# Patient Record
Sex: MALE | Race: WHITE | NOT HISPANIC OR LATINO | Employment: FULL TIME | URBAN - METROPOLITAN AREA
[De-identification: names, ages, dates, MRNs, and addresses within clinical notes are randomized per-mention and may not be internally consistent; named-entity substitution may affect disease eponyms.]

---

## 2018-08-08 ENCOUNTER — OFFICE VISIT (OUTPATIENT)
Dept: URGENT CARE | Facility: CLINIC | Age: 28
End: 2018-08-08

## 2018-08-08 ENCOUNTER — NON-PROVIDER VISIT (OUTPATIENT)
Dept: URGENT CARE | Facility: CLINIC | Age: 28
End: 2018-08-08

## 2018-08-08 VITALS
SYSTOLIC BLOOD PRESSURE: 120 MMHG | DIASTOLIC BLOOD PRESSURE: 72 MMHG | HEIGHT: 74 IN | OXYGEN SATURATION: 100 % | BODY MASS INDEX: 19.25 KG/M2 | TEMPERATURE: 98.2 F | WEIGHT: 150 LBS | HEART RATE: 78 BPM

## 2018-08-08 DIAGNOSIS — Z02.5 ROUTINE SPORTS PHYSICAL EXAM: ICD-10-CM

## 2018-08-08 DIAGNOSIS — Z02.1 PRE-EMPLOYMENT DRUG TESTING: ICD-10-CM

## 2018-08-08 PROCEDURE — 8907 PR URINE COLLECT ONLY: Performed by: FAMILY MEDICINE

## 2018-08-08 PROCEDURE — 7100 PR DOT PHYSICAL: Performed by: FAMILY MEDICINE

## 2019-10-19 ENCOUNTER — OCCUPATIONAL MEDICINE (OUTPATIENT)
Dept: URGENT CARE | Facility: PHYSICIAN GROUP | Age: 29
End: 2019-10-19
Payer: COMMERCIAL

## 2019-10-19 VITALS
HEART RATE: 96 BPM | OXYGEN SATURATION: 98 % | TEMPERATURE: 98.4 F | RESPIRATION RATE: 16 BRPM | HEIGHT: 74 IN | SYSTOLIC BLOOD PRESSURE: 128 MMHG | WEIGHT: 150 LBS | DIASTOLIC BLOOD PRESSURE: 80 MMHG | BODY MASS INDEX: 19.25 KG/M2

## 2019-10-19 DIAGNOSIS — Z23 NEED FOR TETANUS BOOSTER: ICD-10-CM

## 2019-10-19 DIAGNOSIS — S51.811A FOREARM LACERATION, RIGHT, INITIAL ENCOUNTER: Primary | ICD-10-CM

## 2019-10-19 PROCEDURE — 12002 RPR S/N/AX/GEN/TRNK2.6-7.5CM: CPT | Mod: 29 | Performed by: PHYSICIAN ASSISTANT

## 2019-10-19 PROCEDURE — 90715 TDAP VACCINE 7 YRS/> IM: CPT | Mod: 29 | Performed by: PHYSICIAN ASSISTANT

## 2019-10-19 PROCEDURE — 90471 IMMUNIZATION ADMIN: CPT | Mod: 29 | Performed by: PHYSICIAN ASSISTANT

## 2019-10-19 NOTE — LETTER
[facility]  [address] [place] [address] [place]  [place] [address]  Phone:  [phone] - Fax:  [phone]   [facility] Progress Report and Disability Certification  Date of Service: 10/19/2019   No Show:  No  Date / Time of Next Visit: 10/29/2019   Claim Information   Patient Name: Marko Lawton  Claim Number:     Employer: [employer]  Date of Injury: 10/19/2019     Insurer / TPA: [employer]  ID / SSN:     Occupation:   Diagnosis: The primary encounter diagnosis was Forearm laceration, right, initial encounter. A diagnosis of Need for tetanus booster was also pertinent to this visit.    Medical Information   Related to Industrial Injury? Yes    Subjective Complaints:  Right forearm laceration   Objective Findings: 6 cm linear laceration to the proximal aspect of the right forearm   Pre-Existing Condition(s):     Assessment:   Initial Visit    Status: Additional Care Required  Comments:Follow-up in 10 days  Permanent Disability:No    Plan:      Diagnostics:      Comments:       Disability Information   Status: Released to Restricted Duty    From:  10/19/2019  Through: 10/29/2019 Restrictions are: Temporary   Physical Restrictions   Sitting:    Standing:    Stooping:    Bending:      Squatting:    Walking:    Climbing:    Pushing:      Pulling:    Other:    Reaching Above Shoulder (L):   Reaching Above Shoulder (R):       Reaching Below Shoulder (L):    Reaching Below Shoulder (R):      Not to exceed Weight Limits   Carrying(hrs):   Weight Limit(lb): < or = to 10 pounds Lifting(hrs):   Weight  Limit(lb): < or = to 10 pounds   Comments: No lifting more than 10 pounds with right arm.  No other restrictions    Repetitive Actions   Hands: i.e. Fine Manipulations from Grasping:     Feet: i.e. Operating Foot Controls:     Driving / Operate Machinery:     Physician Name: Henry Leyva P.A.-C. Physician Signature: HENRY Maynard P.A.-C. e-Signature: Dr. Chacon  Carlota, Medical Director   Clinic Name / Location: [address] [place] [address] Clinic Phone Number: Dept: [phone]   Appointment Time: 11:00 Am Visit Start Time: 11:12 AM   Check-In Time:  11:11 Am Visit Discharge Time: 12:06 PM   Original-Treating Physician or Chiropractor    Page 2-Insurer/TPA    Page 3-Employer    Page 4-Employee

## 2019-10-19 NOTE — LETTER
[facility]  [address] [place] [address] [place] [address]  Phone:  [phone] - Fax:  [phone]   [facility] Progress Report and Disability Certification  Date of Service: 10/19/2019   No Show:  No  Date / Time of Next Visit: 10/29/2019   Claim Information   Patient Name: Marko Lawton  Claim Number:     Employer: [employer]  Date of Injury: 10/19/2019     Insurer / TPA: [employer]  ID / SSN:     Occupation:   Diagnosis: The primary encounter diagnosis was Forearm laceration, right, initial encounter. A diagnosis of Need for tetanus booster was also pertinent to this visit.    Medical Information   Related to Industrial Injury? Yes    Subjective Complaints:  Right forearm laceration   Objective Findings: 6 cm linear laceration to the proximal aspect of the right forearm   Pre-Existing Condition(s):     Assessment:   Initial Visit    Status: Additional Care Required  Comments:Follow-up in 10 days  Permanent Disability:No    Plan:      Diagnostics:      Comments:       Disability Information   Status: Released to Restricted Duty    From:  10/19/2019  Through: 10/29/2019 Restrictions are: Temporary   Physical Restrictions   Sitting:    Standing:    Stooping:    Bending:      Squatting:    Walking:    Climbing:    Pushing:      Pulling:    Other:    Reaching Above Shoulder (L):   Reaching Above Shoulder (R):       Reaching Below Shoulder (L):    Reaching Below Shoulder (R):      Not to exceed Weight Limits   Carrying(hrs):   Weight Limit(lb): < or = to 10 pounds Lifting(hrs):   Weight  Limit(lb): < or = to 10 pounds   Comments: No lifting more than 10 pounds with right arm.  No other restrictions    Repetitive Actions   Hands: i.e. Fine Manipulations from Grasping:     Feet: i.e. Operating Foot Controls:     Driving / Operate Machinery:     Physician Name: Henry Leyva P.A.-C. Physician Signature: HENRY Maynard P.A.-C. e-Signature: Dr. Chacon  Carlota, Medical Director   Clinic Name / Location: [address] [place] [address] Clinic Phone Number: Dept: [phone]   Appointment Time: 11:00 Am Visit Start Time: 11:12 AM   Check-In Time:  11:11 Am Visit Discharge Time:  11:55 AM    Original-Treating Physician or Chiropractor    Page 2-Insurer/TPA    Page 3-Employer    Page 4-Employee

## 2019-10-19 NOTE — LETTER
"EMPLOYEE’S CLAIM FOR COMPENSATION/ REPORT OF INITIAL TREATMENT  FORM C-4    EMPLOYEE’S CLAIM - PROVIDE ALL INFORMATION REQUESTED   First Name  Marko Last Name  eJred Birthdate                    1990                Sex  male Claim Number   Home Address  [address] Age  29 y.o. Height  1.88 m (6' 2\") Weight  68 kg (150 lb) [facility]     [facility] Zip  [address] Telephone  [phone] (home)    Mailing Address  [address] [facility] Zip  [address] Primary Language Spoken  English    Insurer   Third Party   [employer]   Employee's Occupation (Job Title) When Injury or Occupational Disease Occurred      Employer's Name  [employer]  Telephone  [phone]    [place]  [address]  [place]  [address]    Date of Injury  10/19/2019               Hour of Injury  10:35 AM Date Employer Notified  10/19/2019 Last Day of Work after Injury or Occupational Disease  10/19/2019 Supervisor to Whom Injury Reported  Yonathan   Address or Location of Accident (if applicable)  [[address]]   What were you doing at the time of accident? (if applicable)  Grabbing a package     How did this injury or occupational disease occur? (Be specific an answer in detail. Use additional sheet if necessary)  I was grabbing a package, it started to fall, I went to catch it and grazed my forearm on the door Latch.   If you believe that you have an occupational disease, when did you first have knowledge of the disability and it relationship to your employment?  N/A Witnesses to the Accident  N/A      Nature of Injury or Occupational Disease  Laceration  Part(s) of Body Injured or Affected  Lower Arm (R), ,     I certify that the above is true and correct to the best of my knowledge and that I have provided this information in order to obtain the benefits of [place]  I hereby authorize any physician, chiropractor, surgeon, practitioner, or other person, any hospital, including [facility] or [facility], any medical service organization, any insurance company, or other institution or organization to release to each other, any medical or other information, including benefits paid or payable, pertinent to this injury or disease, except information relative to diagnosis, treatment and/or counseling for AIDS, psychological conditions, alcohol or controlled substances, for which I must give specific authorization.  A Photostat of this authorization shall be as valid as the original.     Date 10/19/2019   [facility]   Employee’s Signature   THIS REPORT MUST BE COMPLETED AND MAILED WITHIN 3 WORKING DAYS OF TREATMENT   [facility]  Name of [facility]   Date  10/19/2019 Diagnosis  (S51.811A) Forearm laceration, right, initial encounter  (primary encounter diagnosis)  (Z23) Need for tetanus booster Is there evidence the injured employee was under the influence of alcohol and/or another controlled substance at the time of accident?   Hour  11:12 AM Description of Injury or Disease  The primary encounter diagnosis was Forearm laceration, right, initial encounter. A diagnosis of Need for tetanus booster was also pertinent to this visit. No   Treatment  Laceration repaire, tetanus booster  Have you advised the patient to remain off work five days or more? No   X-Ray Findings      If Yes   From Date  To Date      From information given by the employee, together with medical evidence, can you directly connect this injury or occupational disease as job incurred?  Yes If No Full Duty  No Modified Duty  Yes   Is additional medical care by a physician indicated?  Yes  Comments:Follow-up in 10 days If Modified Duty, Specify any Limitations /  "Restrictions  No lifting more than 10 pounds with right arm   Do you know of any previous injury or disease contributing to this condition or occupational disease?                            No   Date  10/19/2019 Print Doctor’s Name Betsey Leyva P.A.-C. I certify the employer’s copy of  this form was mailed on:   Address  [address] Insurer’s Use Only     [facility] [facility]  [address]    Provider’s Tax ID Number  [id number] Telephone  Dept: [phone]        e-BETSEY Mcnally P.A.-C.   e-Signature: Dr. Oswaldo Van, Medical Director Degree  MD        ORIGINAL-TREATING PHYSICIAN OR CHIROPRACTOR    PAGE 2-INSURER/TPA    PAGE 3-EMPLOYER    PAGE 4-EMPLOYEE             Form C-4 (rev.10/07)              BRIEF DESCRIPTION OF RIGHTS AND BENEFITS  (Pursuant to [place] 616C.050)    Notice of Injury or Occupational Disease (Incident Report Form C-1): If an injury or occupational disease (OD) arises out of and in the course of employment, you must provide written notice to your employer as soon as practicable, but no later than 7 days after the accident or OD. Your employer shall maintain a sufficient supply of the required forms.    Claim for Compensation (Form C-4): If medical treatment is sought, the form C-4 is available at the place of initial treatment. A completed \"Claim for Compensation\" (Form C-4) must be filed within 90 days after an accident or OD. The treating physician or chiropractor must, within 3 working days after treatment, complete and mail to the employer, the employer's insurer and third-party , the Claim for Compensation.    Medical Treatment: If you require medical treatment for your on-the-job injury or OD, you may be required to select a physician or chiropractor from a list provided by your workers’ compensation insurer, if it has contracted with an Organization for Managed Care (MCO) or Preferred Provider Organization (PPO) or providers of health care. If your " employer has not entered into a contract with an MCO or PPO, you may select a physician or chiropractor from the Panel of Physicians and Chiropractors. Any medical costs related to your industrial injury or OD will be paid by your insurer.    Temporary Total Disability (TTD): If your doctor has certified that you are unable to work for a period of at least 5 consecutive days, or 5 cumulative days in a 20-day period, or places restrictions on you that your employer does not accommodate, you may be entitled to TTD compensation.    Temporary Partial Disability (TPD): If the wage you receive upon reemployment is less than the compensation for TTD to which you are entitled, the insurer may be required to pay you TPD compensation to make up the difference. TPD can only be paid for a maximum of 24 months.    Permanent Partial Disability (PPD): When your medical condition is stable and there is an indication of a PPD as a result of your injury or OD, within 30 days, your insurer must arrange for an evaluation by a rating physician or chiropractor to determine the degree of your PPD. The amount of your PPD award depends on the date of injury, the results of the PPD evaluation and your age and wage.    Permanent Total Disability (PTD): If you are medically certified by a treating physician or chiropractor as permanently and totally disabled and have been granted a PTD status by your insurer, you are entitled to receive monthly benefits not to exceed 66 2/3% of your average monthly wage. The amount of your PTD payments is subject to reduction if you previously received a PPD award.    Vocational Rehabilitation Services: You may be eligible for vocational rehabilitation services if you are unable to return to the job due to a permanent physical impairment or permanent restrictions as a result of your injury or occupational disease.    Transportation and Per [place] Reimbursement: You may be eligible for travel expenses and per  mary associated with medical treatment.    Reopening: You may be able to reopen your claim if your condition worsens after claim closure.    Appeal Process: If you disagree with a written determination issued by the insurer or the insurer does not respond to your request, you may appeal to the Department of Administration, , by following the instructions contained in your determination letter. You must appeal the determination within 70 days from the date of the determination letter at [address], [address], [place], [place] [address], or [address], [address], [place], [place] [address]. If you disagree with the  decision, you may appeal to the Department of Administration, . You must file your appeal within 30 days from the date of the  decision letter at [address], [address], [place], [place] [address], or [address] [facility] [address], [place], [place] [address]. If you disagree with a decision of an , you may file a petition for judicial review with the District Court. You must do so within 30 days of the Appeal Officer’s decision. You may be represented by an  at your own expense or you may contact the [facility] for possible representation.    [place]  for Injured Workers (NAIW): If you disagree with a  decision, you may request that NAIW represent you without charge at an  Hearing. For information regarding denial of benefits, you may contact the [facility] at: [address], [address], [place] [address], [phone], or [address], [address], [place] [address], [phone]    To File a Complaint with the Division: If you wish to file a complaint with the  of the Division of Industrial Relations (DIR),  please contact the Workers’ Compensation Section, [address], [address], [place], [place] [address], telephone (135)  [phone], or [address], [address] [facility], [place], [place] [address], telephone [phone].    For assistance with Workers’ Compensation Issues: You may contact the Office of the Governor Consumer Health Assistance, [address] [place] [address], [address], [place], [place] [address], Toll Free 1-290.228.1203, Web site: http://[employer].[facility].nv., E-mail [email or web address].[email or web address].                             __________________________________________________________________                                                                   _____10/19/2019____________                Employee Name / Signature                                                                                                                                                       Date                                                                                                                                                                                                     D-2 (rev. 06/18)

## 2019-10-19 NOTE — LETTER
"EMPLOYEE’S CLAIM FOR COMPENSATION/ REPORT OF INITIAL TREATMENT  FORM C-4    EMPLOYEE’S CLAIM - PROVIDE ALL INFORMATION REQUESTED   First Name  Marko Last Name  Jered Birthdate                    1990                Sex  male Claim Number   Home Address  [address] Age  29 y.o. Height  1.88 m (6' 2\") Weight  68 kg (150 lb) [facility] [place]     [place] Zip  [address] Telephone  [phone] (home)    Mailing Address  [address] [facility] [place]  [address] Primary Language Spoken  English    Insurer   Third Party   [employer]   Employee's Occupation (Job Title) When Injury or Occupational Disease Occurred      Employer's Name  [employer]  Telephone  [phone]    Employ[place]  [address]  Zip  [address]   Date of Injury  10/19/2019               Hour of Injury  10:35 AM Date Employer Notified  10/19/2019 Last Day of Work after Injury or Occupational Disease  10/19/2019 Supervisor to Whom Injury Reported  Yonathan   Address or Location of Accident (if applicable)  [[address]]   What were you doing at the time of accident? (if applicable)  Grabbing a package     How did this injury or occupational disease occur? (Be specific an answer in detail. Use additional sheet if necessary)  I was grabbing a package, it started to fall, I went to catch it and grazed my forearm on the door Latch.   If you believe that you have an occupational disease, when did you first have knowledge of the disability and it relationship to your employment?  N/A Witnesses to the Accident  N/A      Nature of Injury or Occupational Disease  Laceration  Part(s) of Body Injured or Affected  Lower Arm (R), ,     I certify that the above is true and correct to the best of my knowledge and that I have provided this information in order to obtain the benefits of [place] " [place]).  I hereby authorize any physician, chiropractor, surgeon, practitioner, or other person, any hospital, including [facility] or [facility], any medical service organization, any insurance company, or other institution or organization to release to each other, any medical or other information, including benefits paid or payable, pertinent to this injury or disease, except information relative to diagnosis, treatment and/or counseling for AIDS, psychological conditions, alcohol or controlled substances, for which I must give specific authorization.  A Photostat of this authorization shall be as valid as the original.     Date 10/19/2019   Place [facility]    Employee’s Signature   THIS REPORT MUST BE COMPLETED AND MAILED WITHIN 3 WORKING DAYS OF TREATMENT   [facility]  Name of Facility  [facility]   Date  10/19/2019 Diagnosis  (S51.811A) Forearm laceration, right, initial encounter  (primary encounter diagnosis)  (Z23) Need for tetanus booster Is there evidence the injured employee was under the influence of alcohol and/or another controlled substance at the time of accident?   Hour  11:12 AM Description of Injury or Disease  The primary encounter diagnosis was Forearm laceration, right, initial encounter. A diagnosis of Need for tetanus booster was also pertinent to this visit. No   Treatment  Laceration repaire, tetanus booster  Have you advised the patient to remain off work five days or more? No   X-Ray Findings      If Yes   From Date  To Date      From information given by the employee, together with medical evidence, can you directly connect this injury or occupational disease as job incurred?  Yes If No Full Duty  No Modified Duty  Yes   Is additional medical care by a physician indicated?  Yes  Comments:Follow-up in 10 days If Modified Duty, Specify any Limitations /  "Restrictions  No lifting more than 10 pounds with right arm   Do you know of any previous injury or disease contributing to this condition or occupational disease?                            No   Date  10/19/2019 Print Doctor’s Name Betsey Leyva P.A.-C. I certify the employer’s copy of  this form was mailed on:   Address  [address] Insurer’s Use Only     [place]  [address]    Provider’s Tax ID Number  [id number] Telephone  Dept: [phone]        e-BETSEY Mcnally P.A.-C.   e-Signature: Dr. Oswaldo Van, Medical Director Degree  MD        ORIGINAL-TREATING PHYSICIAN OR CHIROPRACTOR    PAGE 2-INSURER/TPA    PAGE 3-EMPLOYER    PAGE 4-EMPLOYEE             Form C-4 (rev.10/07)              BRIEF DESCRIPTION OF RIGHTS AND BENEFITS  (Pursuant to [place] 616C.050)    Notice of Injury or Occupational Disease (Incident Report Form C-1): If an injury or occupational disease (OD) arises out of and in the course of employment, you must provide written notice to your employer as soon as practicable, but no later than 7 days after the accident or OD. Your employer shall maintain a sufficient supply of the required forms.    Claim for Compensation (Form C-4): If medical treatment is sought, the form C-4 is available at the place of initial treatment. A completed \"Claim for Compensation\" (Form C-4) must be filed within 90 days after an accident or OD. The treating physician or chiropractor must, within 3 working days after treatment, complete and mail to the employer, the employer's insurer and third-party , the Claim for Compensation.    Medical Treatment: If you require medical treatment for your on-the-job injury or OD, you may be required to select a physician or chiropractor from a list provided by your workers’ compensation insurer, if it has contracted with an Organization for Managed Care (MCO) or Preferred Provider Organization (PPO) or providers of health care. If your " employer has not entered into a contract with an MCO or PPO, you may select a physician or chiropractor from the Panel of Physicians and Chiropractors. Any medical costs related to your industrial injury or OD will be paid by your insurer.    Temporary Total Disability (TTD): If your doctor has certified that you are unable to work for a period of at least 5 consecutive days, or 5 cumulative days in a 20-day period, or places restrictions on you that your employer does not accommodate, you may be entitled to TTD compensation.    Temporary Partial Disability (TPD): If the wage you receive upon reemployment is less than the compensation for TTD to which you are entitled, the insurer may be required to pay you TPD compensation to make up the difference. TPD can only be paid for a maximum of 24 months.    Permanent Partial Disability (PPD): When your medical condition is stable and there is an indication of a PPD as a result of your injury or OD, within 30 days, your insurer must arrange for an evaluation by a rating physician or chiropractor to determine the degree of your PPD. The amount of your PPD award depends on the date of injury, the results of the PPD evaluation and your age and wage.    Permanent Total Disability (PTD): If you are medically certified by a treating physician or chiropractor as permanently and totally disabled and have been granted a PTD status by your insurer, you are entitled to receive monthly benefits not to exceed 66 2/3% of your average monthly wage. The amount of your PTD payments is subject to reduction if you previously received a PPD award.    Vocational Rehabilitation Services: You may be eligible for vocational rehabilitation services if you are unable to return to the job due to a permanent physical impairment or permanent restrictions as a result of your injury or occupational disease.    Transportation and Per [place] Reimbursement: You may be eligible for travel expenses and per  mary associated with medical treatment.    Reopening: You may be able to reopen your claim if your condition worsens after claim closure.    Appeal Process: If you disagree with a written determination issued by the insurer or the insurer does not respond to your request, you may appeal to the Department of Administration, , by following the instructions contained in your determination letter. You must appeal the determination within 70 days from the date of the determination letter at [address], [address], [place], [place] [address], or [address], [place], [place] [address]. If you disagree with the  decision, you may appeal to the Department of Administration, . You must file your appeal within 30 days from the date of the  decision letter at [address], [address], [place], [place] [address], or [address] [facility] [address], [place], [place] [address]. If you disagree with a decision of an , you may file a petition for judicial review with the District Court. You must do so within 30 days of the Appeal Officer’s decision. You may be represented by an  at your own expense or you may contact the [facility] for possible representation.    [place]  for Injured Workers (NAIW): If you disagree with a  decision, you may request that NAIW represent you without charge at an  Hearing. For information regarding denial of benefits, you may contact the [facility] at: [address], [address], [place] [address], [phone], or [address], [address], [place] [address], [phone]    To File a Complaint with the Division: If you wish to file a complaint with the  of the Division of Industrial Relations (DIR),  please contact the Workers’ Compensation Section, [address], [address], [place], [place] [address], telephone [phone]  [phone], or [address], [address], [place], [place] [address], telephone [phone].    For assistance with Workers’ Compensation Issues: You may contact the Office of the Governor Consumer Health Assistance, [address] [place] [address], [address], [place], [place] [address], Toll Free 1-780.187.3895, Web site: [email or web address]., E-mail [email or web address].                             __________________________________________________________________                                                                   _____10/19/2019____________                Employee Name / Signature                                                                                                                                                       Date                                                                                                                                                                                                     D-2 (rev. 06/18)

## 2019-10-19 NOTE — PROGRESS NOTES
"Chief Complaint   Patient presents with   • Laceration       HISTORY OF PRESENT ILLNESS: Patient is a 29 y.o. male who presents today because he is here for a Worker's Comp. injury.    Date of injury is today 10/19/2019.  This is his first visit in urgent care.    The underside of his right forearm got caught on a door latch of his truck causing a laceration.  He is not up-to-date on his tetanus.  Denies any distal paresthesias.  He came directly to the urgent care for evaluation after he saw    There are no active problems to display for this patient.      Allergies:Patient has no known allergies.    No current [facility]-ordered outpatient medications on file.     No current Epic-ordered facility-administered medications on file.        History reviewed. No pertinent past medical history.    Social History     Tobacco Use   • Smoking status: Former Smoker   • Smokeless tobacco: Current User     Types: Snuff   Substance Use Topics   • Alcohol use: Yes   • Drug use: No       No family status information on file.   History reviewed. No pertinent family history.    ROS:  Review of Systems   Constitutional: Negative for fever, chills, weight loss and malaise/fatigue.   HENT: Negative for ear pain, nosebleeds, congestion, sore throat and neck pain.    Eyes: Negative for blurred vision.   Respiratory: Negative for cough, sputum production, shortness of breath and wheezing.    Cardiovascular: Negative for chest pain, palpitations, orthopnea and leg swelling.   Gastrointestinal: Negative for heartburn, nausea, vomiting and abdominal pain.   Genitourinary: Negative for dysuria, urgency and frequency.     Exam:  /80 (BP Location: Left arm, Patient Position: Sitting, BP Cuff Size: Adult)   Pulse 96   Temp 36.9 °C (98.4 °F) (Temporal)   Resp 16   Ht 1.88 m (6' 2\")   Wt 68 kg (150 lb)   SpO2 98%   General:  Well nourished, well developed male in NAD  Head:Normocephalic, atraumatic  Eyes: PERRLA, EOM within normal limits, " no conjunctival injection, no scleral icterus, visual fields and acuity grossly intact.  Extremities: no clubbing, cyanosis, or edema.  Skin: On his right forearm he has an 6 cm linear laceration.  Good distal circulation, sensation and strength.    Please note that this dictation was created using voice recognition software. I have made every reasonable attempt to correct obvious errors, but I expect that there are errors of grammar and possibly content that I did not discover before finalizing the note.    Assessment/Plan:  1. Forearm laceration, right, initial encounter     2. Need for tetanus booster  Tdap =>6yo IM   1% lidocaine without epinephrine local, suture repair using 8 simple interrupted sutures 4-0.  Wound care instructions given.    Follow-up in 10 days for suture removal, sooner for any other complications.

## 2019-10-29 ENCOUNTER — OCCUPATIONAL MEDICINE (OUTPATIENT)
Dept: URGENT CARE | Facility: PHYSICIAN GROUP | Age: 29
End: 2019-10-29
Payer: COMMERCIAL

## 2019-10-29 VITALS
HEART RATE: 104 BPM | DIASTOLIC BLOOD PRESSURE: 84 MMHG | TEMPERATURE: 97.9 F | OXYGEN SATURATION: 99 % | RESPIRATION RATE: 14 BRPM | SYSTOLIC BLOOD PRESSURE: 132 MMHG | HEIGHT: 74 IN | BODY MASS INDEX: 19.25 KG/M2 | WEIGHT: 150 LBS

## 2019-10-29 DIAGNOSIS — S51.811D LACERATION OF RIGHT FOREARM, SUBSEQUENT ENCOUNTER: ICD-10-CM

## 2019-10-29 DIAGNOSIS — Z48.02 VISIT FOR SUTURE REMOVAL: ICD-10-CM

## 2019-10-29 PROCEDURE — 99214 OFFICE O/P EST MOD 30 MIN: CPT | Mod: 29 | Performed by: PHYSICIAN ASSISTANT

## 2019-10-29 NOTE — PROGRESS NOTES
"Chief Complaint   Patient presents with   • Follow-Up       HISTORY OF PRESENT ILLNESS: Patient is a 29 y.o. male who presents today because he is here for a Worker's Comp. injury follow-up.      Date of injury is today 10/19/2019. This is his second visit in urgent care.   The underside of his right forearm got caught on a door latch of his truck causing a laceration.  He had 8 sutures placed and also received a tetanus booster at his last visit 10 days ago.  Since that time he has been healing well without any signs of infection.  Sutures removed without complication  There are no active problems to display for this patient.      Allergies:Patient has no known allergies.    No current Epic-ordered outpatient medications on file.     No current Epic-ordered facility-administered medications on file.        History reviewed. No pertinent past medical history.    Social History     Tobacco Use   • Smoking status: Former Smoker   • Smokeless tobacco: Current User     Types: Snuff   Substance Use Topics   • Alcohol use: Yes   • Drug use: No       No family status information on file.   History reviewed. No pertinent family history.    ROS:  Review of Systems   Constitutional: Negative for fever, chills, weight loss and malaise/fatigue.   HENT: Negative for ear pain, nosebleeds, congestion, sore throat and neck pain.    Eyes: Negative for blurred vision.   Respiratory: Negative for cough, sputum production, shortness of breath and wheezing.    Cardiovascular: Negative for chest pain, palpitations, orthopnea and leg swelling.   Gastrointestinal: Negative for heartburn, nausea, vomiting and abdominal pain.   Genitourinary: Negative for dysuria, urgency and frequency.     Exam:  /84 (BP Location: Left arm, Patient Position: Sitting, BP Cuff Size: Adult)   Pulse (!) 104   Temp 36.6 °C (97.9 °F) (Temporal)   Resp 14   Ht 1.88 m (6' 2\")   Wt 68 kg (150 lb)   SpO2 99%   General:  Well nourished, well developed male " in NAD  Head:Normocephalic, atraumatic  Eyes: PERRLA, EOM within normal limits, no conjunctival injection, no scleral icterus, visual fields and acuity grossly intact.  Extremities: no clubbing, cyanosis, or edema.  Healing laceration to right forearm    Please note that this dictation was created using voice recognition software. I have made every reasonable attempt to correct obvious errors, but I expect that there are errors of grammar and possibly content that I did not discover before finalizing the note.    Assessment/Plan:  1. Laceration of right forearm, subsequent encounter     2. Visit for suture removal     Sutures removed without complication    MMI

## 2019-10-29 NOTE — LETTER
[place] [address] [address] [address]  Phone:  [phone] - Fax:  [phone]   [facility] Progress Report and Disability Certification  Date of Service: 10/29/2019   No Show:  No  Date / Time of Next Visit:     Claim Information   Patient Name: Marko Lawton  Claim Number:     Employer: [employer]  Date of Injury: 10/19/2019     Insurer / TPA: [employer]  ID / SSN:     Occupation:   Diagnosis: Diagnoses of Laceration of right forearm, subsequent encounter and Visit for suture removal were pertinent to this visit.    Medical Information   Related to Industrial Injury? Yes    Subjective Complaints:  Healed right forearm laceration   Objective Findings: Healed right forearm laceration   Pre-Existing Condition(s):     Assessment:   Condition Improved    Status: Discharged /  MMI  Permanent Disability:No    Plan:      Diagnostics:      Comments:       Disability Information   Status: Released to Full Duty    From:     Through:   Restrictions are:     Physical Restrictions   Sitting:    Standing:    Stooping:    Bending:      Squatting:    Walking:    Climbing:    Pushing:      Pulling:    Other:    Reaching Above Shoulder (L):   Reaching Above Shoulder (R):       Reaching Below Shoulder (L):    Reaching Below Shoulder (R):      Not to exceed Weight Limits   Carrying(hrs):   Weight Limit(lb):   Lifting(hrs):   Weight  Limit(lb):     Comments:      Repetitive Actions   Hands: i.e. Fine Manipulations from Grasping:     Feet: i.e. Operating Foot Controls:     Driving / Operate Machinery:     Physician Name: Henry Leyva P.A.-C. Physician Signature: HENRY Maynard P.A.-C. e-Signature: Dr. Oswaldo Van, Medical Director   Clinic Name / Location: [place] [address] [address] [address] Clinic Phone Number: Dept: [phone]   Appointment Time: 12:00 Pm Visit Start Time: 10:50 AM   Check-In Time:  10:43  Am Visit Discharge Time: 11:09 Am    Original-Treating Physician or Chiropractor    Page 2-Insurer/TPA    Page 3-Employer    Page 4-Employee

## 2019-10-29 NOTE — LETTER
[place] [address] [address] [address]  Phone:  [phone] - Fax:  [phone]   [facility] Progress Report and Disability Certification  Date of Service: 10/29/2019   No Show:  No  Date / Time of Next Visit:     Claim Information   Patient Name: Marko Lawton  Claim Number:     Employer: [employer]  Date of Injury: 10/19/2019     Insurer / TPA: [employer]  ID / SSN:     Occupation:   Diagnosis: Diagnoses of Laceration of right forearm, subsequent encounter and Visit for suture removal were pertinent to this visit.    Medical Information   Related to Industrial Injury? Yes    Subjective Complaints:  Healed right forearm laceration   Objective Findings: Healed right forearm laceration   Pre-Existing Condition(s):     Assessment:   Condition Improved    Status: Discharged /  MMI  Permanent Disability:No    Plan:      Diagnostics:      Comments:       Disability Information   Status: Released to Full Duty    From:     Through:   Restrictions are:     Physical Restrictions   Sitting:    Standing:    Stooping:    Bending:      Squatting:    Walking:    Climbing:    Pushing:      Pulling:    Other:    Reaching Above Shoulder (L):   Reaching Above Shoulder (R):       Reaching Below Shoulder (L):    Reaching Below Shoulder (R):      Not to exceed Weight Limits   Carrying(hrs):   Weight Limit(lb):   Lifting(hrs):   Weight  Limit(lb):     Comments:      Repetitive Actions   Hands: i.e. Fine Manipulations from Grasping:     Feet: i.e. Operating Foot Controls:     Driving / Operate Machinery:     Physician Name: Henry Leyva P.A.-C. Physician Signature: HENRY Maynard P.A.-C. e-Signature: Dr. Oswaldo Van, Medical Director   Clinic Name / Location: [place] [address] [address] [address] Clinic Phone Number: Dept: [phone]   Appointment Time: 12:00 Pm Visit Start Time: 10:50 AM   Check-In Time:  10:43 Am  Visit Discharge Time:  11:08 AM   Original-Treating Physician or Chiropractor    Page 2-Insurer/TPA    Page 3-Employer    Page 4-Employee

## 2024-10-22 ENCOUNTER — OFFICE VISIT (OUTPATIENT)
Dept: URGENT CARE | Facility: CLINIC | Age: 34
End: 2024-10-22
Payer: COMMERCIAL

## 2024-10-22 VITALS
HEART RATE: 108 BPM | DIASTOLIC BLOOD PRESSURE: 74 MMHG | OXYGEN SATURATION: 94 % | BODY MASS INDEX: 17.97 KG/M2 | RESPIRATION RATE: 18 BRPM | HEIGHT: 74 IN | TEMPERATURE: 97.2 F | SYSTOLIC BLOOD PRESSURE: 116 MMHG | WEIGHT: 140 LBS

## 2024-10-22 DIAGNOSIS — H66.002 NON-RECURRENT ACUTE SUPPURATIVE OTITIS MEDIA OF LEFT EAR WITHOUT SPONTANEOUS RUPTURE OF TYMPANIC MEMBRANE: ICD-10-CM

## 2024-10-22 DIAGNOSIS — R50.9 FEVER, UNSPECIFIED FEVER CAUSE: ICD-10-CM

## 2024-10-22 DIAGNOSIS — U07.1 COVID: ICD-10-CM

## 2024-10-22 DIAGNOSIS — R00.0 TACHYCARDIA: ICD-10-CM

## 2024-10-22 DIAGNOSIS — R11.2 NAUSEA AND VOMITING, UNSPECIFIED VOMITING TYPE: ICD-10-CM

## 2024-10-22 LAB
FLUAV RNA SPEC QL NAA+PROBE: NEGATIVE
FLUBV RNA SPEC QL NAA+PROBE: NEGATIVE
RSV RNA SPEC QL NAA+PROBE: NEGATIVE
SARS-COV-2 RNA RESP QL NAA+PROBE: POSITIVE

## 2024-10-22 PROCEDURE — 99214 OFFICE O/P EST MOD 30 MIN: CPT

## 2024-10-22 PROCEDURE — 0241U POCT CEPHEID COV-2, FLU A/B, RSV - PCR: CPT

## 2024-10-22 PROCEDURE — 3074F SYST BP LT 130 MM HG: CPT

## 2024-10-22 PROCEDURE — 3078F DIAST BP <80 MM HG: CPT

## 2024-10-22 RX ORDER — ONDANSETRON 4 MG/1
4 TABLET, ORALLY DISINTEGRATING ORAL ONCE
Status: COMPLETED | OUTPATIENT
Start: 2024-10-22 | End: 2024-10-22

## 2024-10-22 RX ORDER — ONDANSETRON 4 MG/1
4 TABLET, ORALLY DISINTEGRATING ORAL EVERY 6 HOURS PRN
Qty: 15 TABLET | Refills: 0 | Status: SHIPPED | OUTPATIENT
Start: 2024-10-22

## 2024-10-22 RX ADMIN — ONDANSETRON 4 MG: 4 TABLET, ORALLY DISINTEGRATING ORAL at 10:36
